# Patient Record
Sex: MALE | Race: WHITE
[De-identification: names, ages, dates, MRNs, and addresses within clinical notes are randomized per-mention and may not be internally consistent; named-entity substitution may affect disease eponyms.]

---

## 2020-12-25 ENCOUNTER — HOSPITAL ENCOUNTER (EMERGENCY)
Dept: HOSPITAL 75 - ER | Age: 30
Discharge: HOME | End: 2020-12-25
Payer: COMMERCIAL

## 2020-12-25 VITALS — DIASTOLIC BLOOD PRESSURE: 86 MMHG | SYSTOLIC BLOOD PRESSURE: 148 MMHG

## 2020-12-25 VITALS — WEIGHT: 169.76 LBS | BODY MASS INDEX: 28.28 KG/M2 | HEIGHT: 64.96 IN

## 2020-12-25 DIAGNOSIS — S61.213A: Primary | ICD-10-CM

## 2020-12-25 DIAGNOSIS — Z23: ICD-10-CM
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
## 2020-12-25 PROCEDURE — 90715 TDAP VACCINE 7 YRS/> IM: CPT

## 2020-12-25 PROCEDURE — 12041 INTMD RPR N-HF/GENIT 2.5CM/<: CPT

## 2020-12-25 NOTE — ED UPPER EXTREMITY
General


Chief Complaint:  Laceration


Stated Complaint:  CUT FINGER


Nursing Triage Note:  


 ARRIVED VIA AMB TO ROOM 06 WITHOUT DIFFICULTY. COMPLAINS OF CUTTING HIS LEFT 


MIDDLE FINGER WITH A KNIFE.


Nursing Sepsis Screen:  No Definite Risk


Source:  patient


Exam Limitations:  no limitations





History of Present Illness


Date Seen by Provider:  Dec 25, 2020


Time Seen by Provider:  09:37


Initial Comments


Here with 2 cm laceration to the dorsum of the left third finger middle phalanx 

area.  Caused by knife accidentally cutting when he was opening [employer]

ckages.  Denies other injury.  Patient did clean it afterwards but bleeding 

continued so he presented for evaluation.  Right-hand-dominant.


Onset:  just prior to arrival (Proximally 30 minutes ago)


Severity:  mild


Method of Injury:  incised


Modifying Factors:  Improves With Immobilization; Worse With Movement





Allergies and Home Medications


Allergies


Coded Allergies:  


     No Known Drug Allergies (Unverified , 12/25/20)





Patient Home Medication List


Home Medication List Reviewed:  Yes





Review of Systems


Constitutional:  see HPI; No fever, No weakness


Respiratory:  no symptoms reported


Cardiovascular:  no symptoms reported


Skin:  see HPI; No change in color; lesions


Psychiatric/Neurological:  Denies Numbness, Denies Weakness





Past Medical-Social-Family Hx


Past Med/Social Hx:  Reviewed Nursing Past Med/Soc Hx


Patient Social History


Alcohol Use:  Occasionally Uses


Recreational Drug Use:  No


Smoking Status:  Never a Smoker


Recent Foreign Travel:  No


Contact w/Someone Who Travel:  No


Recent Infectious Disease Expo:  No


Recent Hopitalizations:  No





Seasonal Allergies


Seasonal Allergies:  No





Past Medical History


Appendectomy, Orthopedic


Respiratory:  No


Cardiac:  No


Neurological:  No


Genitourinary:  No


Gastrointestinal:  No


Musculoskeletal:  No


Endocrine:  No


HEENT:  No


Cancer:  No


Psychosocial:  No


Integumentary:  No





Family Medical History


Reviewed Nursing Family Hx





Physical Exam


Vital Signs





Vital Signs - First Documented








 12/25/20





 09:30


 


Temp 37.0


 


Pulse 86


 


Resp 16


 


B/P (MAP) 186/111 (136)


 


Pulse Ox 100


 


O2 Delivery Room Air





Capillary Refill : Less Than 3 Seconds


Height, Weight, BMI


Height: '"


Weight: lbs. oz. kg; 28.00 BMI


Method:


General Appearance:  WD/WN, no apparent distress


Cardiovascular:  regular rate, rhythm, no murmur


Respiratory:  lungs clear, normal breath sounds


Hand:  normal ROM, Left, laceration (1.5 cm laceration to the dorsum of the left

finger middle phalanx with bleeding easily controlled with direct pressure.  No 

other injury noted.)


Neurologic/Psychiatric:  alert, oriented x 3; No sensory deficit





Procedures/Interventions





   Wound Location:  Upper Extremities


Other Wound Location


Left third finger dorsum over middle phalanx


   Wound Length (cm):  1.5


   Wound's Depth, Shape:  linear


   Wound Explored:  contaminated


   Betadine Prep?:  Yes


   Anesthesia:  1% Lidocaine


   Volume Anesthetic (ccs):  3


   Wound Debrided:  minimal


   Suture:  Prolene


   Suture Size:  4-0


   Number of Sutures:  3


   Layer Closure?:  1


   Number Deep Layer Sutures:  0


Progress


Cleaned with Betasept and saline.  Irrigated.  Anesthetized and closed with 

simple interrupted sutures.  Tolerated procedure well with no complications.  

Covered with antibiotic ointment and bulky dressing.





Progress/Results/Core Measures


Results/Orders


My Orders





Orders - OMKAR HULL MD


Dipht,Amira(Acell),Tet Adult (Boostrix (12/25/20 09:45)


Lidocaine 1% Inj 20 Ml (Xylocaine 1% Inj (12/25/20 09:38)





Vital Signs/I&O











 12/25/20





 09:30


 


Temp 37.0


 


Pulse 86


 


Resp 16


 


B/P (MAP) 186/111 (136)


 


Pulse Ox 100


 


O2 Delivery Room Air














Blood Pressure Mean:                    136











Progress


Progress Note :  


Progress Note


Seen and evaluated.  Wound cleaned and closed.  Tetanus updated.  Discharged 

home with return precautions.  Patient verbalized understanding of instructions 

and agreement with plan.





Departure


Impression





   Primary Impression:  


   Laceration of finger of left hand without foreign body without damage to nail


   Qualified Codes:  S61.213A - Laceration without foreign body of left middle 

   finger without damage to nail, initial encounter


Disposition:  01 HOME, SELF-CARE


Condition:  Improved





Departure-Patient Inst.


Decision time for Depature:  09:57


Patient Instructions:  Laceration Repair With Stitches (DC)





Add. Discharge Instructions:  








All discharge instructions reviewed with patient and/or family. Voiced unders

tanding.





Sutures out in 10 days.  You may use antibiotic ointment and dressing over 

wound.  Use bulky dressing to prevent flexion of the finger for the next week or

so and then as needed.  Return for worse pain, swelling, red streaks up the 

hand, foul-smelling drainage or other concerns as needed.  It is okay to shower 

and gently wash your hands but do not soak wound for prolonged period of time in

any body of water.











OMKAR HULL MD          Dec 25, 2020 09:58